# Patient Record
Sex: MALE | Race: WHITE | NOT HISPANIC OR LATINO | Employment: FULL TIME | URBAN - METROPOLITAN AREA
[De-identification: names, ages, dates, MRNs, and addresses within clinical notes are randomized per-mention and may not be internally consistent; named-entity substitution may affect disease eponyms.]

---

## 2024-03-15 ENCOUNTER — OFFICE VISIT (OUTPATIENT)
Dept: INTERNAL MEDICINE | Facility: CLINIC | Age: 34
End: 2024-03-15
Payer: COMMERCIAL

## 2024-03-15 VITALS
SYSTOLIC BLOOD PRESSURE: 122 MMHG | BODY MASS INDEX: 23.19 KG/M2 | HEIGHT: 70 IN | TEMPERATURE: 98.6 F | DIASTOLIC BLOOD PRESSURE: 81 MMHG | WEIGHT: 162 LBS

## 2024-03-15 DIAGNOSIS — Z00.00 HEALTHCARE MAINTENANCE: Primary | ICD-10-CM

## 2024-03-15 DIAGNOSIS — E04.1 THYROID NODULE: ICD-10-CM

## 2024-03-15 RX ORDER — PROPRANOLOL HYDROCHLORIDE 10 MG/1
10 TABLET ORAL AS NEEDED
COMMUNITY
Start: 2024-02-28

## 2024-03-15 NOTE — ASSESSMENT & PLAN NOTE
Counseled on healthy diet, sleep, exercise.  Patient will target 6 to 8 hours of sleep nightly, 150 minutes moderate intensity exercise 3 times weekly, robust diet with plenty of fruits vegetables, lean proteins.  Avoid sugary processed fatty foods.  Routine labs today.  Had hep C screening previously while at his healthcare maintenance.  Attempt obtain immunization records to identify Tdap need

## 2024-03-15 NOTE — PROGRESS NOTES
"Chief Complaint  Establish Care    Subjective        Segundo Gonzalez presents to [facility]  History of Present Illness  #Healthcare maintenance  Due for Tdap, feels like he may have gotten it before though.  Additionally had hepatitis C screening last year.  Reports that it was negative.  Objective   Vital Signs:  /81   Temp 98.6 °F (37 °C)   Ht 177.8 cm (70\")   Wt 73.5 kg (162 lb)   BMI 23.24 kg/m²   Estimated body mass index is 23.24 kg/m² as calculated from the following:    Height as of this encounter: 177.8 cm (70\").    Weight as of this encounter: 73.5 kg (162 lb).       BMI is within normal parameters. No other follow-up for BMI required.      Physical Exam  Vitals reviewed.   Constitutional:       General: He is not in acute distress.     Appearance: Normal appearance.   HENT:      Head: Normocephalic and atraumatic.   Eyes:      General: No scleral icterus.     Extraocular Movements: Extraocular movements intact.      Pupils: Pupils are equal, round, and reactive to light.   Neck:      Thyroid: Thyroid mass present. No thyroid tenderness.      Comments: Palpable thyroid mass of the left lower lobe of thyroid.  Cardiovascular:      Rate and Rhythm: Normal rate and regular rhythm.      Heart sounds: No murmur heard.     No friction rub. No gallop.   Pulmonary:      Effort: Pulmonary effort is normal.      Breath sounds: Normal breath sounds. No wheezing, rhonchi or rales.   Abdominal:      General: Abdomen is flat. Bowel sounds are normal. There is no distension.      Palpations: Abdomen is soft.      Tenderness: There is no abdominal tenderness. There is no guarding.   Musculoskeletal:         General: Normal range of motion.      Right lower leg: No edema.      Left lower leg: No edema.   Skin:     General: Skin is warm and dry.      Capillary Refill: Capillary refill takes less than 2 seconds.      Coloration: Skin is not jaundiced.      Findings: No rash. "   Neurological:      General: No focal deficit present.      Mental Status: He is alert and oriented to person, place, and time.   Psychiatric:         Mood and Affect: Mood normal.         Behavior: Behavior normal.        Result Review :                     Assessment and Plan     Diagnoses and all orders for this visit:    1. Healthcare maintenance (Primary)  Assessment & Plan:  Counseled on healthy diet, sleep, exercise.  Patient will target 6 to 8 hours of sleep nightly, 150 minutes moderate intensity exercise 3 times weekly, robust diet with plenty of fruits vegetables, lean proteins.  Avoid sugary processed fatty foods.  Routine labs today.  Had hep C screening previously while at his healthcare maintenance.  Attempt obtain immunization records to identify Tdap need    Orders:  -     Hemoglobin A1c  -     Lipid Panel  -     Comprehensive Metabolic Panel  -     CBC & Differential    2. Thyroid nodule  Assessment & Plan:  Previously evaluated through ENT, had a thyroid ultrasound, was reportedly TI-RADS 2.  Has not changed much but he does feel like he is perseverating around it.  Wanted to know whether be reasonable to reimage today, expressed patient my concern that reimaging so recently after his previous evaluation would not be likely to demonstrate any new features that have occurred over the last 4 to 5 months, especially in the absence of any other symptoms.  He is understanding of this we will continue to monitor.               Follow Up     Return in about 6 months (around 9/15/2024).  Patient was given instructions and counseling regarding his condition or for health maintenance advice. Please see specific information pulled into the AVS if appropriate.

## 2024-03-15 NOTE — ASSESSMENT & PLAN NOTE
Previously evaluated through ENT, had a thyroid ultrasound, was reportedly TI-RADS 2.  Has not changed much but he does feel like he is perseverating around it.  Wanted to know whether be reasonable to reimage today, expressed patient my concern that reimaging so recently after his previous evaluation would not be likely to demonstrate any new features that have occurred over the last 4 to 5 months, especially in the absence of any other symptoms.  He is understanding of this we will continue to monitor.

## 2024-03-16 LAB
ALBUMIN SERPL-MCNC: 4.7 G/DL (ref 3.5–5.2)
ALBUMIN/GLOB SERPL: 2 G/DL
ALP SERPL-CCNC: 70 U/L (ref 39–117)
ALT SERPL-CCNC: 28 U/L (ref 1–41)
AST SERPL-CCNC: 17 U/L (ref 1–40)
BASOPHILS # BLD AUTO: 0.03 10*3/MM3 (ref 0–0.2)
BASOPHILS NFR BLD AUTO: 0.4 % (ref 0–1.5)
BILIRUB SERPL-MCNC: 0.6 MG/DL (ref 0–1.2)
BUN SERPL-MCNC: 11 MG/DL (ref 6–20)
BUN/CREAT SERPL: 10.4 (ref 7–25)
CALCIUM SERPL-MCNC: 9.8 MG/DL (ref 8.6–10.5)
CHLORIDE SERPL-SCNC: 102 MMOL/L (ref 98–107)
CHOLEST SERPL-MCNC: 206 MG/DL (ref 0–200)
CO2 SERPL-SCNC: 26.2 MMOL/L (ref 22–29)
CREAT SERPL-MCNC: 1.06 MG/DL (ref 0.76–1.27)
EGFRCR SERPLBLD CKD-EPI 2021: 94.4 ML/MIN/1.73
EOSINOPHIL # BLD AUTO: 0.07 10*3/MM3 (ref 0–0.4)
EOSINOPHIL NFR BLD AUTO: 0.9 % (ref 0.3–6.2)
ERYTHROCYTE [DISTWIDTH] IN BLOOD BY AUTOMATED COUNT: 12.1 % (ref 12.3–15.4)
GLOBULIN SER CALC-MCNC: 2.4 GM/DL
GLUCOSE SERPL-MCNC: 90 MG/DL (ref 65–99)
HBA1C MFR BLD: 5.4 % (ref 4.8–5.6)
HCT VFR BLD AUTO: 45.8 % (ref 37.5–51)
HDLC SERPL-MCNC: 51 MG/DL (ref 40–60)
HGB BLD-MCNC: 15.2 G/DL (ref 13–17.7)
IMM GRANULOCYTES # BLD AUTO: 0.03 10*3/MM3 (ref 0–0.05)
IMM GRANULOCYTES NFR BLD AUTO: 0.4 % (ref 0–0.5)
LDLC SERPL CALC-MCNC: 139 MG/DL (ref 0–100)
LYMPHOCYTES # BLD AUTO: 2.12 10*3/MM3 (ref 0.7–3.1)
LYMPHOCYTES NFR BLD AUTO: 27.9 % (ref 19.6–45.3)
MCH RBC QN AUTO: 29.7 PG (ref 26.6–33)
MCHC RBC AUTO-ENTMCNC: 33.2 G/DL (ref 31.5–35.7)
MCV RBC AUTO: 89.5 FL (ref 79–97)
MONOCYTES # BLD AUTO: 0.53 10*3/MM3 (ref 0.1–0.9)
MONOCYTES NFR BLD AUTO: 7 % (ref 5–12)
NEUTROPHILS # BLD AUTO: 4.83 10*3/MM3 (ref 1.7–7)
NEUTROPHILS NFR BLD AUTO: 63.4 % (ref 42.7–76)
NRBC BLD AUTO-RTO: 0 /100 WBC (ref 0–0.2)
PLATELET # BLD AUTO: 204 10*3/MM3 (ref 140–450)
POTASSIUM SERPL-SCNC: 4 MMOL/L (ref 3.5–5.2)
PROT SERPL-MCNC: 7.1 G/DL (ref 6–8.5)
RBC # BLD AUTO: 5.12 10*6/MM3 (ref 4.14–5.8)
SODIUM SERPL-SCNC: 140 MMOL/L (ref 136–145)
TRIGL SERPL-MCNC: 88 MG/DL (ref 0–150)
VLDLC SERPL CALC-MCNC: 16 MG/DL (ref 5–40)
WBC # BLD AUTO: 7.61 10*3/MM3 (ref 3.4–10.8)

## 2024-05-15 RX ORDER — PROPRANOLOL HYDROCHLORIDE 40 MG/1
40 TABLET ORAL AS NEEDED
Qty: 30 TABLET | Refills: 1 | Status: SHIPPED | OUTPATIENT
Start: 2024-05-15

## 2024-06-19 ENCOUNTER — OFFICE VISIT (OUTPATIENT)
Dept: INTERNAL MEDICINE | Facility: CLINIC | Age: 34
End: 2024-06-19
Payer: COMMERCIAL

## 2024-06-19 VITALS
SYSTOLIC BLOOD PRESSURE: 134 MMHG | DIASTOLIC BLOOD PRESSURE: 90 MMHG | WEIGHT: 162 LBS | HEIGHT: 70 IN | BODY MASS INDEX: 23.19 KG/M2

## 2024-06-19 DIAGNOSIS — R07.9 CHEST PAIN, UNSPECIFIED TYPE: Primary | ICD-10-CM

## 2024-06-19 PROCEDURE — 99213 OFFICE O/P EST LOW 20 MIN: CPT | Performed by: STUDENT IN AN ORGANIZED HEALTH CARE EDUCATION/TRAINING PROGRAM

## 2024-06-19 PROCEDURE — 93000 ELECTROCARDIOGRAM COMPLETE: CPT | Performed by: STUDENT IN AN ORGANIZED HEALTH CARE EDUCATION/TRAINING PROGRAM

## 2024-06-19 NOTE — PROGRESS NOTES
"Chief Complaint  Chest Pain    Subjective        Segundo Gonzalez presents to [facility]  History of Present Illness  #Tachycardia  Patient was out running on Sunday, tried to run further than he does typically.  Tomorrow on his second mile, he started to notice a significantly elevated heart rate.  Patient believes his heart rate was somewhere in the 220s.  Improved with rest, no recurrence of symptoms.  Went running again on Monday and Tuesday, no recurrence of tachycardia.  Objective   Vital Signs:  /90 (BP Location: Left arm, Patient Position: Sitting)   Ht 177.8 cm (70\")   Wt 73.5 kg (162 lb)   BMI 23.24 kg/m²   Estimated body mass index is 23.24 kg/m² as calculated from the following:    Height as of this encounter: 177.8 cm (70\").    Weight as of this encounter: 73.5 kg (162 lb).       BMI is within normal parameters. No other follow-up for BMI required.      Physical Exam  Vitals reviewed.   Constitutional:       General: He is not in acute distress.     Appearance: Normal appearance.   HENT:      Head: Normocephalic and atraumatic.   Eyes:      General: No scleral icterus.     Extraocular Movements: Extraocular movements intact.      Pupils: Pupils are equal, round, and reactive to light.   Cardiovascular:      Rate and Rhythm: Normal rate and regular rhythm.      Heart sounds: No murmur heard.     No friction rub. No gallop.   Pulmonary:      Effort: Pulmonary effort is normal.      Breath sounds: Normal breath sounds. No wheezing, rhonchi or rales.   Abdominal:      General: Abdomen is flat. Bowel sounds are normal. There is no distension.      Palpations: Abdomen is soft.      Tenderness: There is no abdominal tenderness. There is no guarding.   Musculoskeletal:         General: Normal range of motion.      Right lower leg: No edema.      Left lower leg: No edema.   Skin:     General: Skin is warm and dry.      Capillary Refill: Capillary refill takes less than 2 " seconds.      Coloration: Skin is not jaundiced.      Findings: No rash.   Neurological:      General: No focal deficit present.      Mental Status: He is alert and oriented to person, place, and time.   Psychiatric:         Mood and Affect: Mood normal.         Behavior: Behavior normal.        Result Review :          EKG: there are no previous tracings available for comparison, sinus tachycardia.  Normal axis, no significant LVH.  No significant signs of ischemia.             Assessment and Plan     Diagnoses and all orders for this visit:    1. Chest pain, unspecified type (Primary)  -     ECG 12 Lead  -     Cardiac event monitor; Future      Will obtain cardiac event monitor in setting of chest pain, other reassuring sinus tachycardia without ST elevation on EKG.  Would favor likely anxiety induced versus exercise-induced tachycardia.  Will obtain cardiac event monitor to rule out underlying structural etiology, arrhythmia.       Follow Up     No follow-ups on file.  Patient was given instructions and counseling regarding his condition or for health maintenance advice. Please see specific information pulled into the AVS if appropriate.

## 2024-09-18 ENCOUNTER — OFFICE VISIT (OUTPATIENT)
Dept: INTERNAL MEDICINE | Facility: CLINIC | Age: 34
End: 2024-09-18
Payer: COMMERCIAL

## 2024-09-18 VITALS
WEIGHT: 150.2 LBS | SYSTOLIC BLOOD PRESSURE: 115 MMHG | BODY MASS INDEX: 21.5 KG/M2 | HEIGHT: 70 IN | DIASTOLIC BLOOD PRESSURE: 76 MMHG

## 2024-09-18 DIAGNOSIS — F41.8 PERFORMANCE ANXIETY: Primary | ICD-10-CM

## 2024-09-18 PROCEDURE — 99213 OFFICE O/P EST LOW 20 MIN: CPT | Performed by: STUDENT IN AN ORGANIZED HEALTH CARE EDUCATION/TRAINING PROGRAM
